# Patient Record
Sex: MALE | Race: BLACK OR AFRICAN AMERICAN | NOT HISPANIC OR LATINO | Employment: FULL TIME | URBAN - METROPOLITAN AREA
[De-identification: names, ages, dates, MRNs, and addresses within clinical notes are randomized per-mention and may not be internally consistent; named-entity substitution may affect disease eponyms.]

---

## 2018-03-18 ENCOUNTER — HOSPITAL ENCOUNTER (EMERGENCY)
Facility: HOSPITAL | Age: 23
Discharge: HOME | End: 2018-03-18
Attending: EMERGENCY MEDICINE

## 2018-03-18 VITALS
BODY MASS INDEX: 20.04 KG/M2 | WEIGHT: 140 LBS | DIASTOLIC BLOOD PRESSURE: 73 MMHG | SYSTOLIC BLOOD PRESSURE: 121 MMHG | TEMPERATURE: 98 F | OXYGEN SATURATION: 100 % | RESPIRATION RATE: 15 BRPM | HEIGHT: 70 IN | HEART RATE: 72 BPM

## 2018-03-18 DIAGNOSIS — S61.012A LACERATION OF LEFT THUMB WITHOUT FOREIGN BODY WITHOUT DAMAGE TO NAIL, INITIAL ENCOUNTER: Primary | ICD-10-CM

## 2018-03-18 PROCEDURE — 12001 RPR S/N/AX/GEN/TRNK 2.5CM/<: CPT

## 2018-03-18 PROCEDURE — 90715 TDAP VACCINE 7 YRS/> IM: CPT | Performed by: NURSE PRACTITIONER

## 2018-03-18 PROCEDURE — 90471 IMMUNIZATION ADMIN: CPT | Performed by: NURSE PRACTITIONER

## 2018-03-18 PROCEDURE — 99283 EMERGENCY DEPT VISIT LOW MDM: CPT | Mod: 25

## 2018-03-18 PROCEDURE — 25000003 PHARM REV CODE 250: Performed by: NURSE PRACTITIONER

## 2018-03-18 PROCEDURE — 63600175 PHARM REV CODE 636 W HCPCS: Performed by: NURSE PRACTITIONER

## 2018-03-18 RX ORDER — HYDROCODONE BITARTRATE AND ACETAMINOPHEN 5; 325 MG/1; MG/1
1 TABLET ORAL EVERY 6 HOURS PRN
Qty: 12 TABLET | Refills: 0 | Status: SHIPPED | OUTPATIENT
Start: 2018-03-18 | End: 2018-03-21

## 2018-03-18 RX ORDER — LIDOCAINE HYDROCHLORIDE 10 MG/ML
10 INJECTION INFILTRATION; PERINEURAL
Status: COMPLETED | OUTPATIENT
Start: 2018-03-18 | End: 2018-03-18

## 2018-03-18 RX ADMIN — CLOSTRIDIUM TETANI TOXOID ANTIGEN (FORMALDEHYDE INACTIVATED), CORYNEBACTERIUM DIPHTHERIAE TOXOID ANTIGEN (FORMALDEHYDE INACTIVATED), BORDETELLA PERTUSSIS TOXOID ANTIGEN (GLUTARALDEHYDE INACTIVATED), BORDETELLA PERTUSSIS FILAMENTOUS HEMAGGLUTININ ANTIGEN (FORMALDEHYDE INACTIVATED), BORDETELLA PERTUSSIS PERTACTIN ANTIGEN, AND BORDETELLA PERTUSSIS FIMBRIAE 2/3 ANTIGEN 0.5 ML: 5; 2; 2.5; 5; 3; 5 INJECTION, SUSPENSION INTRAMUSCULAR at 05:03

## 2018-03-18 RX ADMIN — LIDOCAINE HYDROCHLORIDE 10 ML: 10 INJECTION, SOLUTION INFILTRATION; PERINEURAL at 05:03

## 2018-03-18 NOTE — ED TRIAGE NOTES
Pt cut left thumb Friday with a [place] knife. Knife was clean at the time. Jagged 1.5 inch lac present. Sensation & movement intact

## 2018-03-18 NOTE — ED PROVIDER NOTES
Encounter Date: 3/18/2018       History     Chief Complaint   Patient presents with    Laceration     cut to left thumb with kitchen knife on Friday.  States remains bleeding.     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  He reports his tetanus was last given at 2008.  He reports the pain is constant and a 5/10.  He reports a character pain as burning.  He taken no medications nor use any treatments for this problem.  The pain does not radiate and is the same throughout the day.  He denies any trouble with moving the thumb or sensation loss at the tip of the thumb.      The history is provided by the patient. No  was used.     Review of patient's allergies indicates:  No Known Allergies  History reviewed. No pertinent past medical history.  History reviewed. No pertinent surgical history.  History reviewed. No pertinent family history.  Social History   Substance Use Topics    Smoking status: Never Smoker    Smokeless tobacco: Never Used    Alcohol use No     Review of Systems   Constitutional: Negative for appetite change, chills, diaphoresis, fatigue and fever.   HENT: Negative for congestion, ear discharge, ear pain, postnasal drip, rhinorrhea, sinus pressure, sneezing, sore throat and voice change.    Eyes: Negative for discharge, itching and visual disturbance.   Respiratory: Negative for cough, shortness of breath and wheezing.    Cardiovascular: Negative for chest pain, palpitations and leg swelling.   Gastrointestinal: Negative for abdominal pain, nausea and vomiting.   Endocrine: Negative for polydipsia, polyphagia and polyuria.   Genitourinary: Negative for difficulty urinating, discharge, dysuria, frequency, hematuria, penile pain, penile swelling and urgency.   Musculoskeletal: Negative for arthralgias and myalgias.    Skin: Positive for wound. Negative for rash.   Neurological: Negative for dizziness, seizures, syncope and weakness.   Hematological: Negative for adenopathy. Does not bruise/bleed easily.   Psychiatric/Behavioral: Negative for agitation and self-injury. The patient is not nervous/anxious.        Physical Exam     Initial Vitals [03/18/18 1632]   BP Pulse Resp Temp SpO2   122/66 77 15 97.3 °F (36.3 °C) 98 %      MAP       84.67         Physical Exam    Nursing note and vitals reviewed.  Constitutional: He appears well-developed and well-nourished. He is not diaphoretic. No distress.   HENT:   Head: Normocephalic and atraumatic.   Right Ear: External ear normal.   Left Ear: External ear normal.   Nose: Nose normal.   Eyes: Pupils are equal, round, and reactive to light. Right eye exhibits no discharge. Left eye exhibits no discharge. No scleral icterus.   Neck: Normal range of motion.   Pulmonary/Chest: No respiratory distress.   Abdominal: He exhibits no distension.   Musculoskeletal: Normal range of motion.        Left hand: Left thumb: Exhibits bleeding. Injuries: laceration.        Hands:  Neurological: He is alert and oriented to person, place, and time.   Skin: Skin is dry. Capillary refill takes less than 2 seconds.         ED Course   Lac Repair  Date/Time: 3/18/2018 6:20 PM  Performed by: AYUSH RIVAS.  Authorized by: WHITNEY REYES   Consent Done: Not Needed  Body area: upper extremity  Location details: left thumb  Laceration length: 2 cm  Foreign bodies: no foreign bodies  Tendon involvement: none  Nerve involvement: none  Vascular damage: no  Anesthesia: local infiltration    Anesthesia:  Local Anesthetic: lidocaine 1% without epinephrine  Anesthetic total: 2 mL  Patient sedated: no  Preparation: Patient was prepped and draped in the usual sterile fashion.  Irrigation solution: saline  Irrigation method: jet lavage  Amount of cleaning: standard  Debridement: none  Degree of undermining:  none  Skin closure: 4-0 nylon  Number of sutures: 5  Technique: simple  Approximation: close  Approximation difficulty: simple  Patient tolerance: Patient tolerated the procedure well with no immediate complications        Labs Reviewed - No data to display          Medical Decision Making:   This is an evaluation of a 22 y.o. male that presents to the Emergency Department for a Laceration. Physical Exam shows a non-toxic, afebrile, and well appearing male. There is a laceration to left thumb. There is no surrounding erythema or area of increased warmth. all compartments are soft. There is full ROM of thumb joints against resistance. Equal sensation to the extremity. No visible tendon lacerations observed on exam.  The wound was irrigated and visually inspected prior to closure. No visible foreign bodies noted. Vital Signs Are Reassuring. Tetanus is not up to date.   The wound was closed per the procedure note.     My overall impression is Laceration. I considered, but at this time, do not suspect cellulitis, compartment syndrome, underlying fracture, tendon injury, or suspect any retained foreign body at this time.     ED Course: adacel 0.5ml im given. D/C Meds: norco 5/325mg po q6h prn pain. Additional D/C Information: Laceration/Wound Care/Suture Removal instructions given. The diagnosis, treatment plan, instructions for follow-up and reevaluation with his PCP or Return to ED in 5-7 days for suture removal as well as ED return precautions were discussed and understanding was verbalized. All questions or concerns have been addressed. This case was discussed with and the patient has been re-examined by Dr. garcia who is in agreement with my assessment and plan.                           Clinical Impression:   The encounter diagnosis was Laceration of left thumb without foreign body without damage to nail, initial encounter.    Disposition:   Disposition: Discharged  Condition: Stable                        Gildardo A.  LORI Phillips  03/18/18 1827

## 2018-03-18 NOTE — PROVIDER PROGRESS NOTES - EMERGENCY DEPT.
Encounter Date: 3/18/2018    ED Physician Progress Notes       SCRIBE NOTE: I, Esau Calvin, am scribing for, and in the presence of,  German De La Torre MD.  Physician Statement: IGerman MD, personally performed the services described in this documentation as scribed by Esau Calvin in my presence, and it is both accurate and complete.     Physician Note:    This is an attestation note referred to from the chart.     Pt has a 2 day old injury secondary to cutting his left thumb with a knife while opening packaging for a steak. Pt is right hand dominant. Pt employers years needed the patient to stay at work and wrapped his thumb with dressing. Pt says his pain improved yesterday. Pt says the wound has been oozing bits of blood after he tried removing the dressing today. Pt currently reports mild pain and says the wound is not dripping blood anymore.   Pt has normal range of motion, flexion, sensation, and pulse to his thumb. Pt has a V-shaped laceration (3cm total; 2cm on one side, 1cm on the other) which will be irrigated and the depth of the flap will be determined.     Pt is a healthy young man with no wound healing or bleeding problems.  5 sutures were applied to close the wounds see the procedure note by nurse practitioner.        Disposition: Discharged  Condition: Stable    Impression:  3 centimeter laceration to the left palmar surface of the distal phalange of the left thumb.  Sutured and repaired.

## 2018-03-18 NOTE — DISCHARGE INSTRUCTIONS
Please keep your wound clean and dry.  Wash gently with soap and water and apply antibiotic ointment (bacitracin, neosporin, etc.) over the wound after washing. Please watch for signs of infection including: increased\spreading redness, swelling, pus-like discharge, or a fever greater than 100.4F. If you experience any of these, please contact your Primary Care Doctor or Return to the Emergency Department for a wound check.     Please follow up with your Primary Care Doctor in 5-7 days for wound recheck and suture removal.  You may return to the Emergency Department if you are unable to see your Primary Care Doctor.  Please return to the ER for any new or worsening symptoms.    You have been given a medication that may cause drowsiness, do not drive or operate heavy machinery with this medication.

## 2018-03-25 ENCOUNTER — HOSPITAL ENCOUNTER (EMERGENCY)
Facility: HOSPITAL | Age: 23
Discharge: HOME | End: 2018-03-25
Attending: EMERGENCY MEDICINE

## 2018-03-25 VITALS
BODY MASS INDEX: 20.04 KG/M2 | OXYGEN SATURATION: 99 % | HEIGHT: 70 IN | WEIGHT: 140 LBS | HEART RATE: 68 BPM | RESPIRATION RATE: 20 BRPM | TEMPERATURE: 98 F | DIASTOLIC BLOOD PRESSURE: 75 MMHG | SYSTOLIC BLOOD PRESSURE: 133 MMHG

## 2018-03-25 DIAGNOSIS — Z48.02 VISIT FOR SUTURE REMOVAL: Primary | ICD-10-CM

## 2018-03-25 PROCEDURE — 99281 EMR DPT VST MAYX REQ PHY/QHP: CPT

## 2018-03-25 NOTE — ED PROVIDER NOTES
Encounter Date: 3/25/2018    SCRIBE #1 NOTE: I, Roger Walker, am scribing for, and in the presence of,  Boris Vincent NP. I have scribed the following portions of the note - Other sections scribed: HPI and ROS.       History     Chief Complaint   Patient presents with    Suture / Staple Removal     left hand, placed last Sunday     Chief Complaint: Suture/ Staple Removal     HPI: This 22 y.o. Male with no known PMHx presents to the ED for a suture removal. Patient was sent at ED 6 days ago secondary to a left hand laceration which he cut with a knife while at work. At this time, he denies wound drainage, redness, fever. No pain.       The history is provided by the patient. No  was used.     Review of patient's allergies indicates:  No Known Allergies  History reviewed. No pertinent past medical history.  History reviewed. No pertinent surgical history.  History reviewed. No pertinent family history.  Social History   Substance Use Topics    Smoking status: Never Smoker    Smokeless tobacco: Never Used    Alcohol use No     Review of Systems   Constitutional: Negative for fever.   HENT: Negative for trouble swallowing.    Gastrointestinal: Negative for vomiting.   Skin: Positive for wound. Negative for color change, pallor and rash.   Neurological: Negative for weakness and numbness.   Psychiatric/Behavioral: Negative for confusion.       Physical Exam     Initial Vitals [03/25/18 1157]   BP Pulse Resp Temp SpO2   133/75 68 20 98 °F (36.7 °C) 99 %      MAP       94.33         Physical Exam    Nursing note and vitals reviewed.  Constitutional: He appears well-developed and well-nourished. He is not diaphoretic. He is cooperative.  Non-toxic appearance. No distress.   HENT:   Head: Normocephalic and atraumatic.   Mouth/Throat: Oropharynx is clear and moist.   Eyes: Conjunctivae and EOM are normal.   Neck: Normal range of motion.   Cardiovascular: Normal rate and regular rhythm.    Neurological: He is alert and oriented to person, place, and time. GCS eye subscore is 4. GCS verbal subscore is 5. GCS motor subscore is 6.   Skin: Skin is warm and dry. Laceration (well healing with sutures in left thumb) noted. No rash noted.   Psychiatric: He has a normal mood and affect. His behavior is normal. Judgment and thought content normal.         ED Course   Suture Removal  Date/Time: 3/25/2018 4:49 PM  Performed by: DAVID HORVATH  Authorized by: KAYLEEN CHARLTON   Body area: upper extremity  Location details: left thumb  Wound Appearance: well healed, clean, nontender and normal color  Sutures Removed: 5  Facility: sutures placed in this facility  Complications: No  Estimated blood loss (mL): 0  Specimens: No  Implants: No        Labs Reviewed - No data to display                APC / Resident Notes:   This is an evaluation of a 22 y.o. male that presents to the Emergency Department for a wound recheck. The patient was initially seen for a laceration to the left thumb. Physical exam reveals a nontoxic and well appearing male. Patient is afebrile vital signs are stable. Wound exam reveals a well-healing laceration to the left palmar thumb with no surrounding erythema or induration noted. Sutures are in place. No purulent discharge expressed. Sutures removed without immediate complication.     The diagnosis, treatment plan, instructions for follow-up and reevaluation with his PCP as well as ED return precautions have been discussed and he has verbalized an understanding of the information. All questions or concerns have been addressed. This case was discussed with Dr. Charlton who is in agreement with my assessment and plan. GIAN Sawant, FNP-C        Scribe Attestation:   Scribe #1: I performed the above scribed service and the documentation accurately describes the services I performed. I attest to the accuracy of the note.    Attending Attestation:           Physician Attestation for  Scribe:  Physician Attestation Statement for Scribe #1: I, Boris Vincent, SETH, reviewed documentation, as scribed by Roger Walker in my presence, and it is both accurate and complete.                    Clinical Impression:   The encounter diagnosis was Visit for suture removal.    Disposition:   Disposition: Discharged  Condition: Stable                        Boris Vincent, CHACHO  03/25/18 2980